# Patient Record
(demographics unavailable — no encounter records)

---

## 2025-01-07 NOTE — HISTORY OF PRESENT ILLNESS
[TextBox_4] : Patient coming for follow-up PFT reviewed patient had a CT scan December 2024 result reviewed overall stable from December 2023 patient brought me the result of the last 2 CAT scan He said for the last couple of weeks been complaining of cough dry sometimes productive clear in color also have some wheezing and shortness of breath on exertion no fever no chills he has been taking Trelegy every day and albuterol as needed

## 2025-01-07 NOTE — PLAN
[TextEntry] : Will start the patient on prednisone taper Continue Trelegy Azithromycin course Chest x-ray ordered told him to do it today and to call me after CT scan in December 2025 he usually do it at small

## 2025-01-07 NOTE — PHYSICAL EXAM
[No Acute Distress] : no acute distress [Normal Appearance] : normal appearance [No Neck Mass] : no neck mass [Normal Rate/Rhythm] : normal rate/rhythm [Normal S1, S2] : normal s1, s2 [No Murmurs] : no murmurs [No Resp Distress] : no resp distress [Wheeze] : wheeze [No Clubbing] : no clubbing [No Cyanosis] : no cyanosis [No Edema] : no edema [FROM] : FROM [Normal Color/ Pigmentation] : normal color/ pigmentation [No Focal Deficits] : no focal deficits [Oriented x3] : oriented x3 [Normal Affect] : normal affect

## 2025-01-07 NOTE — REVIEW OF SYSTEMS
[Nasal Congestion] : no nasal congestion [Postnasal Drip] : no postnasal drip [Cough] : cough [SOB on Exertion] : sob on exertion [Negative] : Endocrine

## 2025-07-14 NOTE — PLAN
[TextEntry] : his symptoms of SOB now more related to the GB follow neurology on immunoglobulin Told patient need to use a BiPAP machine every night and during the day as needed especially with the neuromuscular disease Continue Trelegy once a day Albuterol as needed CT scan will order next visit Follow-up in 4 months Avoid fluid overload

## 2025-07-14 NOTE — HISTORY OF PRESENT ILLNESS
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

## 2025-07-14 NOTE — PHYSICAL EXAM
[No Acute Distress] : no acute distress [Normal Appearance] : normal appearance [No Neck Mass] : no neck mass [Normal Rate/Rhythm] : normal rate/rhythm [Normal S1, S2] : normal s1, s2 [No Murmurs] : no murmurs [No Resp Distress] : no resp distress [Clear to Auscultation Bilaterally] : clear to auscultation bilaterally [No Clubbing] : no clubbing [No Cyanosis] : no cyanosis [No Edema] : no edema [FROM] : FROM [Normal Color/ Pigmentation] : normal color/ pigmentation [No Focal Deficits] : no focal deficits [Oriented x3] : oriented x3 [Normal Affect] : normal affect